# Patient Record
Sex: MALE | Race: BLACK OR AFRICAN AMERICAN
[De-identification: names, ages, dates, MRNs, and addresses within clinical notes are randomized per-mention and may not be internally consistent; named-entity substitution may affect disease eponyms.]

---
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
## 2018-12-26 ENCOUNTER — HOSPITAL ENCOUNTER (EMERGENCY)
Dept: HOSPITAL 62 - ER | Age: 18
Discharge: HOME | End: 2018-12-26
Payer: SELF-PAY

## 2018-12-26 VITALS — DIASTOLIC BLOOD PRESSURE: 66 MMHG | SYSTOLIC BLOOD PRESSURE: 110 MMHG
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
## 2018-12-26 DIAGNOSIS — S09.93XA: Primary | ICD-10-CM
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
## 2018-12-26 DIAGNOSIS — S00.83XA: ICD-10-CM

## 2018-12-26 PROCEDURE — 99283 EMERGENCY DEPT VISIT LOW MDM: CPT

## 2018-12-26 PROCEDURE — 70486 CT MAXILLOFACIAL W/O DYE: CPT
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
## 2019-07-24 ENCOUNTER — HOSPITAL ENCOUNTER (EMERGENCY)
Dept: HOSPITAL 62 - ER | Age: 19
LOS: 1 days | Discharge: HOME | End: 2019-07-25
Payer: SELF-PAY

## 2019-07-24 VITALS — DIASTOLIC BLOOD PRESSURE: 56 MMHG | SYSTOLIC BLOOD PRESSURE: 107 MMHG

## 2019-07-24 DIAGNOSIS — M25.551: ICD-10-CM
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
## 2019-07-24 DIAGNOSIS — S76.011A: Primary | ICD-10-CM
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
## 2019-07-24 PROCEDURE — 99283 EMERGENCY DEPT VISIT LOW MDM: CPT

## 2019-07-25 NOTE — RADIOLOGY REPORT ([facility])
EXAM DESCRIPTION: 



XR HIP 2 OR MORE VIEWS



COMPLETED DATE/TME:  07/25/2019 00:12



CLINICAL HISTORY: 



19 years, Male, HIP INJURY



COMPARISON:

None.



NUMBER OF VIEWS:

2



TECHNIQUE:

AP pelvis single view right hip



LIMITATIONS:

None.



FINDINGS:



Negative for fracture or dislocation. Soft tissues are

unremarkable



IMPRESSION:



Negative exam

 



copyright 2011 [employer]- All Rights Reserved

## 2019-07-25 NOTE — ER DOCUMENT REPORT
ED General





- General


Chief Complaint: Hip Injury


Stated Complaint: HIP PAIN


Time Seen by Provider: 07/25/19 00:11


TRAVEL OUTSIDE OF THE [place] IN LAST 30 DAYS: No





- HPI


Notes: 





19-year-old male to the emergency department with complaints of right hip pain 

that occurred heard tonight.  States he was trying to do a trick and jump onto a

plastic trash can.  He states the trash can when out from under him when he 

tried to jump on it.  He states that he landed on his right hip.  He states that

it hurts when he ambulates or puts pressure onto the hip.  He denies any other 

injuries.  He denies any loss of consciousness.  Denies any bladder or bowel 

incontinence.  Denies any urinary retention.  Denies any radiculopathy down the 

left leg.  Denies any saddle's paresthesias.





- Related Data


Allergies/Adverse Reactions: 


                                        





No Known Allergies Allergy (Verified 12/26/18 15:48)


   











Past Medical History





- General


Information source: Patient, Emergency Med Personnel





- Social History


Smoking Status: Never Smoker


Frequency of alcohol use: None


Drug Abuse: None


Family History: Reviewed & Not Pertinent


Renal/ Medical History: Denies: Hx Peritoneal Dialysis


Musculoskeletal Medical History: Reports Hx Musculoskeletal Trauma


Psychiatric Medical History: Reports: Hx Attention Deficit Hyperactivity 

Disorder - as a child


Traumatic Medical History: Reports: Hx Fractures - Tib-fib





- Immunizations


Hx Diphtheria, Pertussis, Tetanus Vaccination: Yes





Review of Systems





- Review of Systems


Constitutional: denies: Chills, Fever


EENT: No symptoms reported


Cardiovascular: No symptoms reported.  denies: Chest pain, Syncope


Respiratory: No symptoms reported.  denies: Short of breath


Gastrointestinal: denies: Abdominal pain, Diarrhea, Nausea, Vomiting


Genitourinary: denies: Frequency, Incontinence


Musculoskeletal: See HPI, Joint pain


Skin: No symptoms reported


Neurological/Psychological: No symptoms reported


-: Yes All other systems reviewed and negative





Physical Exam





- Vital signs


Vitals: 





                                        











Temp Pulse Resp BP Pulse Ox


 


 98.7 F   65   14   107/56 L  97 


 


 07/24/19 22:57  07/24/19 22:57  07/24/19 22:57  07/24/19 22:57  07/24/19 22:57











Interpretation: Normal





- General


General appearance: Appears well, Alert





- HEENT


Head: Normocephalic, Atraumatic


Eyes: Normal


Pupils: PERRL





- Respiratory


Respiratory status: No respiratory distress


Chest status: Nontender


Breath sounds: Normal


Chest palpation: Normal





- Cardiovascular


Rhythm: Regular


Heart sounds: Normal auscultation


Murmur: No





- Abdominal


Inspection: Normal


Distension: No distension


Bowel sounds: Normal


Tenderness: Nontender


Organomegaly: No organomegaly





- Extremities


Hip: Tender - There is tenderness to palpation over the right hip joint.  There 

is no evidence of deformity.  There is no step-off.  There is no leg length 

discrepancy.  There is no rotation of the leg.  There is no tenderness to 

palpation over the right knee.  There is no tenderness to palpation over the 

right ankle.  There is no midline tenderness to palpation over the cervical, 

thoracic, lumbar midline spine with no step-off or deformity.  Negative straight

leg raise bilaterally..  No: Abrasion, Deformity, Dislocation, Ecchymosis





- Neurological


Neuro grossly intact: Yes


Cognition: Normal


Orientation: AAOx4


Logan Coma Scale Eye Opening: Spontaneous


Patito Coma Scale Verbal: Oriented


Logan Coma Scale Motor: Obeys Commands


Logan Coma Scale Total: 15


Speech: Normal


Motor strength normal: LUE, RUE, LLE, RLE


Sensory: Normal





- Psychological


Associated symptoms: Normal affect, Normal mood





- Skin


Skin Temperature: Warm


Skin Moisture: Dry


Skin Color: Normal





Course





- Re-evaluation


Re-evalutation: 





07/25/19 impression: Right hip injury and right hip strain from falling off of 

the trash can when trying to perform a trick.  X-rays negative for any 

dislocation, fracture.  Will send home with pain medicines and muscle relaxants.

 Have given him information for follow-up with primary care.  Agrees with the 

plan.  Urged to return if any worsening symptoms.





- Vital Signs


Vital signs: 





                                        











Temp Pulse Resp BP Pulse Ox


 


 98.7 F   65   14   107/56 L  97 


 


 07/24/19 22:57  07/24/19 22:57  07/24/19 22:57  07/24/19 22:57  07/24/19 22:57














- Diagnostic Test


Radiology reviewed: Image reviewed, Reports reviewed





Discharge





- Discharge


Clinical Impression: 


 Injury of right hip





Strain of right hip


Qualifiers:


 Encounter type: initial encounter Qualified Code(s): S76.011A - Strain of 

muscle, fascia and tendon of right hip, initial encounter





Condition: Stable


Disposition: HOME, SELF-CARE


Instructions:  Muscle Strain ([facility])


Additional Instructions: 


TAKE MEDICINES AS PRESCRIBED.   APPLY ICE TO THE HIP THREE TIMES A DAY FOR 20 

MINUTES.   NO RUNNING OR SPORTS.  DO NOT LAY IN BED ALL DAY -- DO WALK AND 

GENTLY STRETCH THE HIP.  EXPECT SORENESS OVER THE NEXT WEEK.  FOLLOW UP WITH 

PRIMARY CARE. 


Prescriptions: 


Cyclobenzaprine HCl [Flexeril 10 mg Tablet] 10 mg PO TID #21 tablet


Naproxen [Naprosyn] 500 mg PO BID #20 tablet


Forms:  Return to Work


Referrals: 


[facility] [Provider Group] - Follow up in 1 week

## 2020-01-20 ENCOUNTER — HOSPITAL ENCOUNTER (EMERGENCY)
Dept: HOSPITAL 62 - ER | Age: 20
Discharge: HOME | End: 2020-01-20
Payer: SELF-PAY

## 2020-01-20 VITALS — DIASTOLIC BLOOD PRESSURE: 61 MMHG | SYSTOLIC BLOOD PRESSURE: 111 MMHG

## 2020-01-20 DIAGNOSIS — K02.9: Primary | ICD-10-CM

## 2020-01-20 PROCEDURE — 99282 EMERGENCY DEPT VISIT SF MDM: CPT

## 2020-01-20 NOTE — ER DOCUMENT REPORT
ED Oral Problem





- General


Chief Complaint: Toothache


Stated Complaint: TOOTH PAIN


Time Seen by Provider: 01/20/20 11:17


TRAVEL OUTSIDE OF THE [place] IN LAST 30 DAYS: No





- Related Data


Allergies/Adverse Reactions: 


                                        





No Known Allergies Allergy (Verified 12/26/18 15:48)


   











Past Medical History





- Social History


Smoking Status: Never Smoker


Frequency of alcohol use: None


Drug Abuse: None


Family History: Reviewed & Not Pertinent


Patient has suicidal ideation: No


Patient has homicidal ideation: No


Renal/ Medical History: Denies: Hx Peritoneal Dialysis


Musculoskeletal Medical History: Reports Hx Musculoskeletal Trauma


Psychiatric Medical History: Reports: Hx Attention Deficit Hyperactivity 

Disorder - as a child


Traumatic Medical History: Reports: Hx Fractures - Tib-fib





- Immunizations


Hx Diphtheria, Pertussis, Tetanus Vaccination: Yes





Review of Systems





- Review of Systems


Constitutional: No symptoms reported


EENT: Dental problem


Cardiovascular: No symptoms reported


Respiratory: No symptoms reported


Gastrointestinal: No symptoms reported


Genitourinary: No symptoms reported


Male Genitourinary: No symptoms reported


Musculoskeletal: No symptoms reported


Skin: No symptoms reported


Hematologic/Lymphatic: No symptoms reported


Neurological/Psychological: No symptoms reported





Physical Exam





- Vital signs


Vitals: 


                                        











Temp Pulse Resp BP Pulse Ox


 


 97.8 F   59 L  18   111/61   100 


 


 01/20/20 11:10  01/20/20 11:10  01/20/20 11:10  01/20/20 11:10  01/20/20 11:10











Interpretation: Normal





- General


General appearance: Appears well, Alert





- HEENT


Head: Normocephalic, Atraumatic


Eyes: Normal


Pupils: PERRL


Ears: Normal


External canal: Normal


Tympanic membrane: Normal


Sinus: Normal


Nasal: Normal


Mouth/Lips: Caries


Mucous membranes: Normal


Teeth diagram: 


                            __________________________














                            __________________________





 1 - Large cavity with swelling surrounding the tooth





Pharynx: Normal


Neck: Normal





- Respiratory


Respiratory status: No respiratory distress


Chest status: Nontender


Breath sounds: Normal


Chest palpation: Normal





- Cardiovascular


Rhythm: Regular


Heart sounds: Normal auscultation


Murmur: No





- Abdominal


Inspection: Normal


Distension: No distension


Bowel sounds: Normal


Tenderness: Nontender


Organomegaly: No organomegaly





- Back


Back: Normal, Nontender





- Extremities


General upper extremity: Normal inspection, Nontender, Normal color, Normal ROM,

Normal temperature


General lower extremity: Normal inspection, Nontender, Normal color, Normal ROM,

Normal temperature, Normal weight bearing.  No: Amari's sign





- Neurological


Neuro grossly intact: Yes


Cognition: Normal


Orientation: AAOx4


Patito Coma Scale Eye Opening: Spontaneous


[place] Coma Scale Verbal: Oriented


[place] Coma Scale Motor: Obeys Commands


[place] Coma Scale Total: 15


Speech: Normal


Motor strength normal: LUE, RUE, LLE, RLE


Sensory: Normal





- Psychological


Associated symptoms: Normal affect, Normal mood





- Skin


Skin Temperature: Warm


Skin Moisture: Dry


Skin Color: Normal





Course





- Re-evaluation


Re-evalutation: 





01/20/20 20:45


Presentation is most consistent with likely an infected tooth.  Airway is 

patent.  Vitals within normal limits.  Patient is able swallow without any 

difficulty.  There is no significant facial swelling.  No evidence of Jarod 

angina, apical abscess, or airway obstruction.  Patient will be started on 

antibiotics.  I've instructed to follow-up with dentistry as earliest ability 

for definitive management.  At this time will discharge with return precautions 

and follow-up recommendations.  Verbal discharge instructions given a the 

bedside and opportunity for questions given. Medication warnings reviewed. 

Patient is in agreement with this plan and has verbalized understanding of 

return precautions and the need for primary care follow-up in the next 24-72 

hours.





- Vital Signs


Vital signs: 


                                        











Temp Pulse Resp BP Pulse Ox


 


 97.8 F   59 L  18   111/61   100 


 


 01/20/20 11:10  01/20/20 11:10  01/20/20 11:10  01/20/20 11:10  01/20/20 11:10














Discharge





- Discharge


Clinical Impression: 


 Pain due to dental caries





Condition: Stable


Disposition: HOME, SELF-CARE


Additional Instructions: 


TOOTHACHE:





     Your pain is due to dental decay.  The tooth must be repaired in order for 

you to feel better.  You will, therefore, be referred to a dentist.  We do not 

have dentists on the staff at [facility].


     Severe swelling or drainage around a tooth usually means a dental abscess. 

This also requires evaluation and treatment by the dentist, but antibiotics may 

be prescribed while awaiting dental treatment.


     You should be rechecked immediately if you develop major swelling of the 

face, increasing pain, a lump in the jaw or gums, headache, difficulty 

swallowing, or fever.











PENICILLIN V K:


     You have been given a prescription for Penicillin VK.  Your physician has 

determined that this is the best antibiotic for your condition.


     Pen VK can be taken with meals, however more of the antibiotic gets into 

the bloodstream if it's taken on an empty stomach.


     Penicillin usually has no side effects.  However, allergy to penicillins is

common.  If you have had an allergic reaction to any drug of the penicillin 

family, you should never take any other penicillin.  Notify your doctor at once 

if you develop hives, itching, swelling, faintness, or shortness of breath.








FOLLOW-UP CARE:


     You have been referred for follow-up care to the dentists listed below.  

Call the dentists office for an appointment as you were instructed or within 

the next two days.  If you experience worsening or a significant change in your 

symptoms, notify the physician immediately or return to the Emergency Department

at any time for re-evaluation.





[facility]


[address] [place] [address] [phone]





[facility] [place]


[address]


[phone]





[facility]


[address] [facility]


[phone]





[facility]


[address] [facility] [place]


[phone]


[email or web address]





[facility]


[address] [place]  [address] [phone]


Monday-Thursdays  8:00am to 5:00 pm


Will see patients from other [facility].


Charges based on income and family size and


accepts Medicare, Medicaid, and Insurances


Will pull molars





[facility]


Student Clinics


[facility] [address] [phone]


Hours of Operation


   8:00 am - 4:30 pm weekdays





The following dental offices accept Medicaid:





   [facility]   [phone]


   Dr. Lindsay         [phone]


   Dr. Nath         [phone]


   Dr. Chavez         [phone]


   Dr. Soto         [phone]


   [facility] and [place]


      oral surgery      [phone]


   Dr. Dior ([place])      [phone]


   Dr. Ho ([place])   [phone]


   [facility]      [phone]


   Mohini Albert ([place])   [phone]


   Dr. High ([place])      [phone]


   [facility]      [phone]


   [facility]   [phone]


   [facility]   [phone]


   Dr. Lake ([place])      [phone]


   [facility] Napoles and 


      ([place])      [phone]





   Medicaid Care Line      [phone]


Prescriptions: 


Ibuprofen [Motrin 800 mg Tablet] 800 mg PO Q8H PRN #30 tab


 PRN Reason: 


Penicillin V Potassium [Penicillin Vk 500 mg Tablet] 500 mg PO BID #20 tablet


Forms:  Return to Work

## 2020-07-18 ENCOUNTER — HOSPITAL ENCOUNTER (OUTPATIENT)
Dept: HOSPITAL 62 - RDC | Age: 20
End: 2020-07-18
Attending: NURSE PRACTITIONER
Payer: SELF-PAY

## 2020-07-18 VITALS — SYSTOLIC BLOOD PRESSURE: 121 MMHG | DIASTOLIC BLOOD PRESSURE: 82 MMHG

## 2020-07-18 DIAGNOSIS — R51: ICD-10-CM

## 2020-07-18 DIAGNOSIS — U07.1: Primary | ICD-10-CM

## 2020-07-18 DIAGNOSIS — Z71.6: ICD-10-CM

## 2020-07-18 DIAGNOSIS — F17.210: ICD-10-CM

## 2020-07-18 DIAGNOSIS — R05: ICD-10-CM

## 2020-07-18 PROCEDURE — 99201: CPT

## 2020-07-18 PROCEDURE — 87635 SARS-COV-2 COVID-19 AMP PRB: CPT

## 2020-07-18 PROCEDURE — C9803 HOPD COVID-19 SPEC COLLECT: HCPCS

## 2020-07-18 PROCEDURE — 99211 OFF/OP EST MAY X REQ PHY/QHP: CPT

## 2020-07-18 NOTE — ER RDC ASSESSMENT REPORT
Intake





- In the Last 14 days


Have you been in close contact with someone CONFIRMED: No


Worked in Healthcare?: No





- Symptoms


Subjective Fever(Felt feverish): No


Chills: No


Muscule Aches: No


Runny Nose: No


Sore Throat: No


Cough (New or worsening chronic cough): Yes


Shortness of breath: No


Nausea or Vomiting: No


Headache: Yes


Abdominal Pain: No


Diarrhea(3 or more loose stools in last 24 hours): No





- Do you have any of the following


Chronic lung disease: Asthma or emphysema or COPD: No


Cystic Fibrosis: No


Diabetes: No


High Blood Pressure: No


Cardiovascular Disease: No


Chronic Kidney Disease: No


Chronic Liver Disease: No


Chronic blood disorder like Sickle Cell Disease: No


Weak immune system due to disease or medication: No


Neurologic condition that limits movement: No


Developmental delay - Moderate to Severe: No


Recent (within past 2 weeks) or current Pregnancy: No


Morbid Obesity (>100 pounds over ideal weight): No





- Objective


Temperature: 98.3 F


Pulse Rate: 71


Respiratory Rate: 20


Blood Pressure: 121/82


O2 Sat by Pulse Oximetry: 96


Objective: 


Patient is a well-appearing 20-year-old male who presents today for COVID-19 

screening.


Disposition: Home; Selfcare





General





- General


Stated Complaint: Upper respiratory symptoms


Mode of Arrival: Ambulatory


Information source: Patient


Notes: 


The patient was evaluated during the global COVID-19 pandemic. That diagnosis 

was suspected/considered upon initial presentation. Their evaluation, treatment,

and testing was consistent with current guidelines for patients who present with

complaints or symptoms that may be related to COVID-19.








- HPI


Patient complains to provider of: Upper respiratory symptoms


Onset: Other - 3 days


Onset/Duration: Persistent


Quality of pain: No pain


Severity: None


Pain Level: Denies


Associated symptoms: Productive cough, Headache


Exacerbated by: Denies


Relieved by: Denies


Similar symptoms previously: No


Recently seen / treated by doctor: No





- Related Data


Allergies/Adverse Reactions: 


                                        





No Known Allergies Allergy (Verified 12/26/18 15:48)


   











Past Medical History





- Social History


Smoking Status: Current Every Day Smoker


Cigarette use (# per day): Yes - 1 pack/day


Chew tobacco use (# tins/day): No


Smoking Education Provided: Yes


Frequency of alcohol use: Social


Drug Abuse: None


Occupation: [employer]


Family History: Reviewed & Not Pertinent


Patient has suicidal ideation: No


Patient has homicidal ideation: No


Renal/ Medical History: Denies: Hx Peritoneal Dialysis


Musculoskeletal Medical History: Reports Hx Musculoskeletal Trauma


Psychiatric Medical History: Reports: Hx Attention Deficit Hyperactivity 

Disorder - as a child


Traumatic Medical History: Reports: Hx Fractures - Tib-fib





Physical Exam





- General


General appearance: Appears well


In distress: None


Notes: 


PHYSICAL EXAMINATION: 


GENERAL: Well-appearing and in no acute distress. 


HEAD: Atraumatic, normocephalic. 


EYES: sclera anicteric, conjunctiva are normal. 


ENT: nares patent. Moist mucous membranes. 


NECK: Normal range of motion, supple without lymphadenopathy. 


LUNGS: CTAB and equal. No wheezes rales or rhonchi. 


HEART: Regular rate and rhythm without murmurs. 


EXTREMITIES: Normal range of motion, no pitting edema. No cyanosis. 


BACK: No midline or CVA tenderness.


NEUROLOGICAL: Cranial nerves grossly intact. Normal speech. 


PSYCH: Normal mood, normal affect. 


SKIN: Warm, Dry, normal color and turgor, no obvious lesions or rash noted.











Diagnostic Results


Laboratory Results: 


Patient advised at this time they are considered a Person Under Investigation 

(PUI) for the COVID-19 Coronavirus. They have been made aware it is currently 

taking 3-5 days to receive their results, and [facility] will call to advise them of their result, whether it is POSITIVE or 

NEGATIVE.





Patient Education/Counseling


Counseling/Education: 


Patient presents with upper respiratory symptoms worrisome for possible COVID-

19.  Patient does not have symptoms worrisome as an emergency such as difficulty

breathing, shortness of breath, chest pain, pressure, confusion or cyanosis.  

Patient appears suitable for discharge.  Patient's vital signs are stable and 

patient is nontoxic in appearance.  Good return precautions have been discussed 

with patient, patient verbalized understanding and is agreeable with discharge 

plan of care at this time.





Patient provided COVID-19 discharge instructions to include:





As a person under investigation for COVID-19, the [place] department of 

Health and Human Services, division of public health advises you to adhere to 

the following guidance until your test results are reported to you.  If your 

test result is positive, you will receive additional information from your 

provider and your local health department at that time.


 


Remain at home until you are cleared by the health provider or public health 

authorities.


 


Keep a log of visitors to your home, notify any visitors to your home of your 

isolation status.


 


If you plan to move to a new address or leave the county, notify the local [facility] department in your County.


 


Call your doctor or seek care if you have an urgent medical need.  Before 

seeking medical care, call ahead to get instructions from the provider before 

arriving at the medical office clinic or hospital.  Notify them that you are 

being tested for the virus that causes COVID-19 so that arrangements can be 

made, as necessary, to prevent transmission to others in the healthcare setting.

 Next, notify the local health department in your county.


 


If a medical emergency arises and you need to call 911, inform dispatch and the 

first responders that you are being tested for the virus that causes COVID-19.  

Next, notify the local health department in your county.





Patient provided education on smoking cessation and the harmful effects of 

smoking, especially in the presence of Co-morbid conditions such as 

Hypertension, Diabetes, and/or other chronic illnesses. Patient verbalized 

understanding of smoking cessation education, and the increased health benefits 

of quitting.





Guidance for worsening S/SX: 


For worsening symptoms, patient has been advised to contact their Primary Care 

Provider, or go to the nearest Emergency Department.








RDC Discharge





- Discharge


Condition: Stable


Disposition: Home; Selfcare